# Patient Record
Sex: FEMALE | Race: WHITE | NOT HISPANIC OR LATINO
[De-identification: names, ages, dates, MRNs, and addresses within clinical notes are randomized per-mention and may not be internally consistent; named-entity substitution may affect disease eponyms.]

---

## 2017-05-16 ENCOUNTER — HOSPITAL ENCOUNTER (OUTPATIENT)
Dept: LAB | Age: 77
Setting detail: SPECIMEN
Discharge: HOME | End: 2017-05-16

## 2017-05-16 ENCOUNTER — RECORDS - HEALTHEAST (OUTPATIENT)
Dept: LAB | Facility: CLINIC | Age: 77
End: 2017-05-16

## 2017-05-16 LAB
CHOLEST SERPL-MCNC: 203 MG/DL
FASTING STATUS PATIENT QL REPORTED: ABNORMAL
HDLC SERPL-MCNC: 67 MG/DL
LDLC SERPL CALC-MCNC: 120 MG/DL
TRIGL SERPL-MCNC: 78 MG/DL

## 2020-09-14 ENCOUNTER — OFFICE VISIT - HEALTHEAST (OUTPATIENT)
Dept: UROLOGY | Facility: CLINIC | Age: 80
End: 2020-09-14

## 2020-09-14 DIAGNOSIS — N13.2 HYDRONEPHROSIS WITH URINARY OBSTRUCTION DUE TO URETERAL CALCULUS: ICD-10-CM

## 2020-09-14 DIAGNOSIS — N20.0 CALCULUS OF KIDNEY: ICD-10-CM

## 2020-09-14 DIAGNOSIS — N20.1 CALCULUS OF URETER: ICD-10-CM

## 2020-09-14 RX ORDER — ALENDRONATE SODIUM 70 MG/1
70 TABLET ORAL WEEKLY
Status: SHIPPED | COMMUNITY
Start: 2020-09-09

## 2020-09-21 ENCOUNTER — COMMUNICATION - HEALTHEAST (OUTPATIENT)
Dept: UROLOGY | Facility: CLINIC | Age: 80
End: 2020-09-21

## 2020-09-21 ENCOUNTER — AMBULATORY - HEALTHEAST (OUTPATIENT)
Dept: LAB | Facility: CLINIC | Age: 80
End: 2020-09-21

## 2020-09-21 DIAGNOSIS — N20.1 CALCULUS OF URETER: ICD-10-CM

## 2020-09-23 LAB
APPEARANCE STONE: NORMAL
COMPN STONE: NORMAL
NUMBER STONE: 1
SIZE STONE: NORMAL MM
SPECIMEN WT: 5 MG

## 2020-10-01 ENCOUNTER — OFFICE VISIT - HEALTHEAST (OUTPATIENT)
Dept: UROLOGY | Facility: CLINIC | Age: 80
End: 2020-10-01

## 2020-10-01 DIAGNOSIS — N20.1 CALCULUS OF URETER: ICD-10-CM

## 2020-10-01 DIAGNOSIS — N20.0 CALCULUS OF KIDNEY: ICD-10-CM

## 2021-06-11 NOTE — PROGRESS NOTES
"Assessment/Plan:        Diagnoses and all orders for this visit:    Calculus of ureter  -     Symptom Control While Passing a Stone Education  -     CT Abdomen Pelvis Without Oral Without IV Contrast; Future; Expected date: 09/28/2020  -     Patient Stated Goal: Pass my stone    Hydronephrosis with urinary obstruction due to ureteral calculus    Calculus of kidney    Other orders  -     alendronate (FOSAMAX) 70 MG tablet; Take 70 mg by mouth once a week.  -     aspirin 81 MG EC tablet; Take 81 mg by mouth daily.      Stone Management Plan  [facility] 9/14/2020   Urinary Tract Infection No suspicion of infection   Renal Colic Asymptomatic at this time   Renal Failure No suspicion of renal failure   Current CT date 9/12/2020   Right sided stones? Yes   R Number of ureteral stones No ureteral stones   R Number of kidney stones  2   R GSD of kidney stones < 2   R Hydronephrosis None   R Stone Event No current event   R Current Plan Observe   Observe rationale Limited stone burden with good prognosis for spontaneous passage   Left sided stones? Yes   L Number of ureteral stones 1   L GSD of ureteral stones 5   L Location of ureteral stone Proximal   L Number of kidney stones  2   L GSD of kidney stones < 2   L Hydronephrosis Mild   L Stone Event New event   Diagnosis date 9/12/2020   Initial location of primary symptomatic stone Proximal   Initial GSD of primary symptomatic stone 5   L MET Status Initiation   L Current Plan MET   MET 2 week F/U         Phone call duration: 17 minutes    Noemy Fair PA-C    Subjective:        The patient has been notified of following:     \"This telephone visit will be conducted via a call between you and your physician/provider. We have found that certain health care needs can be provided without the need for a physical exam.  This service lets us provide the care you need with a short phone conversation. If a prescription is necessary, we can send it directly to your " "pharmacy.  If labs and/or imaging are needed, we can place orders so that you can have the test (s) done at a later time.    If during the course of the call the physician/provider feels a telephone visit is not appropriate, you will not be charged for this service.\"     HPI  Ms. Yulissa Garcia is a 79 y.o.  female who is being evaluated via a billable telephone visit by [facility] following [facility] ER visit for urolithiasis.    She is a remotely recurrent unidentified composition stone former who has not required stone clearance procedures. She has not previously participated in stone risk evaluation. She has no identified modifiable stone risk factors. She has identified non-modifiable stone risks including:  bilateral stones.    She was evaluated in ER 9/12/20 for acute onset left flank pain x few hours. The pain was initially sharp and severe, reaching 8/10. It was nonradiating and felt similar to pain experienced with remote stone event ~ 8 years ago. She had associated vomiting, urinary frequency and diaphoresis. Workup was notable for CT reporting a left mid ureteral stone and bilateral renal stones. Labs were negative for infection or renal injury. She was sent home with zofran and oxycodone.    She is asymptomatic at present. She denies symptoms of fever, chills, flank pain, nausea, vomiting, urinary frequency and dysuria.     CT scan from 9/12/20 is personally reviewed and demonstrates a mildly obstructing 5 mm left proximal ureteral stone. Few tiny bilateral renal stones.    Significant labs from presentation include severe hematuria, no pyuria, negative nitrite, no bacteria, normal WBC, normal C reactive protein, normal creatinine and normal potassium.    PLAN    78 yo F with remote history of kidney stone disease. Obstructing left proximal ureteral stone, currently asymptomatic. Tiny, bilateral renal stones.     Will proceed with medical expulsive therapy. Risks " and benefits were detailed of medical expulsive therapy including probability of stone passage, recurrent renal colic, and requirement of emergency medical and/or surgical care and further imaging. Patient verbalized understanding. Patient agrees with plan as discussed. She will return in 2 weeks with low dose CT scan.    For symptom control, she was prescribed oxycodone and ondansetron. Over the counter symptom control medications of ibuprofen, Dramamine and Tylenol were recommended.     ROS   A 12 point comprehensive review of systems is negative except for HPI    No past medical history on file.    No past surgical history on file.    Current Outpatient Medications   Medication Sig Dispense Refill     acetaminophen (TYLENOL) 500 MG tablet Take 2 tablets (1,000 mg total) by mouth 4 (four) times a day for 7 days. 56 tablet 0     alendronate (FOSAMAX) 70 MG tablet Take 70 mg by mouth once a week.       aspirin 81 MG EC tablet Take 81 mg by mouth daily.       dimenhyDRINATE (DRAMAMINE) 50 MG tablet Take 1 tablet (50 mg total) by mouth at bedtime for 7 days. 7 tablet 0     ibuprofen (ADVIL,MOTRIN) 200 MG tablet Take 2 tablets (400 mg total) by mouth 4 (four) times a day for 7 days. 56 tablet 0     dimenhyDRINATE (DRAMAMINE) 50 MG tablet Take 1 tablet (50 mg total) by mouth 4 (four) times a day as needed. 28 tablet 0     ondansetron (ZOFRAN ODT) 4 MG disintegrating tablet Take 1 tablet (4 mg total) by mouth every 8 (eight) hours as needed for nausea. 12 tablet 0     oxyCODONE (ROXICODONE) 5 MG immediate release tablet Every 4-6 hours as needed if pain is not improved with acetaminophen and ibuprofen. 12 tablet 0     No current facility-administered medications for this visit.        No Known Allergies    Social History     Socioeconomic History     Marital status:      Spouse name: Not on file     Number of children: Not on file     Years of education: Not on file     Highest education level: Not on file    Occupational History     Not on file   Social Needs     Financial resource strain: Not on file     Food insecurity     Worry: Not on file     Inability: Not on file     Transportation needs     Medical: Not on file     Non-medical: Not on file   Tobacco Use     Smoking status: Not on file   Substance and Sexual Activity     Alcohol use: Not on file     Drug use: Not on file     Sexual activity: Not on file   Lifestyle     Physical activity     Days per week: Not on file     Minutes per session: Not on file     Stress: Not on file   Relationships     Social connections     Talks on phone: Not on file     Gets together: Not on file     Attends Jewish service: Not on file     Active member of club or organization: Not on file     Attends meetings of clubs or organizations: Not on file     Relationship status: Not on file     Intimate partner violence     Fear of current or ex partner: Not on file     Emotionally abused: Not on file     Physically abused: Not on file     Forced sexual activity: Not on file   Other Topics Concern     Not on file   Social History Narrative     Not on file       No family history on file.    Objective:      Physical Exam  There were no vitals filed for this visit.    Labs    Urinalysis POC (Office):  Nitrite, UA   Date Value Ref Range Status   09/12/2020 Negative Negative Final       Lab Urinalysis:  Blood, UA   Date Value Ref Range Status   09/12/2020 Large (!) Negative Final     Nitrite, UA   Date Value Ref Range Status   09/12/2020 Negative Negative Final     Leukocytes, UA   Date Value Ref Range Status   09/12/2020 Negative Negative Final     pH, UA   Date Value Ref Range Status   09/12/2020 6.0 4.5 - 8.0 Final    and Acute Labs   CBC   WBC   Date Value Ref Range Status   09/12/2020 7.1 4.0 - 11.0 thou/uL Final     Hemoglobin   Date Value Ref Range Status   09/12/2020 13.4 12.0 - 16.0 g/dL Final     Platelets   Date Value Ref Range Status   09/12/2020 159 140 - 440 thou/uL Final    , C Reactive Protein    CRP   Date Value Ref Range Status   09/12/2020 0.1 0.0 - 0.8 mg/dL Final    and Renal Panel  [facility]  Creatinine   Date Value Ref Range Status   09/12/2020 0.80 0.60 - 1.10 mg/dL Final   09/11/2015 0.76 0.60 - 1.10 mg/dL Final     Potassium   Date Value Ref Range Status   09/12/2020 4.0 3.5 - 5.0 mmol/L Final   09/11/2015 4.6 3.5 - 5.0 mmol/L Final     Calcium   Date Value Ref Range Status   09/12/2020 9.7 8.5 - 10.5 mg/dL Final   09/11/2015 9.6 8.5 - 10.5 mg/dL Final

## 2021-06-11 NOTE — PATIENT INSTRUCTIONS - HE
Patient Stated Goal: Prevent further stones  Calcium Oxalate Stone Prevention Self Management    Drink more fluids:    Drinking more liquids is the best way you can help prevent future stones. Stones can form when substances in the urine are too concentrated. The more you drink, the more urine you will make. This means all substances in the urine will be less concentrated.    How much urine should I be producing?    The usual recommended daily urine production is about 2 to 3 quarts (0163-9220 ml). If you are producing more than 3 quarts of urine on a regular basis, it is possible to deplete important minerals stored in the body.    To measure the amount of urine you produce in a day, you can either:    Collect all urine in a container and measure at the end of the day     Use a measuring cup each time you urinate and add up the amounts at the end of the day     Observe    Color - Dark pam urine is concentrated. Light straw color or lighter is dilute and desirable     Odor - Concentrated urine tends to smell stronger. Dilute urine is nearly odorless    Ways to increase your fluid intake    Increasing the amount of fluid you drink is effective for all types of kidney stones. While water is commonly recommended, all fluids are effective for increasing the amount of urine your body produces.    Focus on starting a lifelong habit, rather than a short-term solution.     Keep liquids on hand that you like. Crystal Light is a low calorie appropriate choice.    Drink out of larger glasses. You'll tend to drink more with each serving.     Have an additional glass of fluid with each meal.     Keep a water or drink bottle at work and fill it regularly.     *If you are prone to fluid retention, consult your doctor before making changes to your fluid habits.    Low Oxalate Diet:    Avoid excess amounts or daily consumption of these foods:    All nuts and nut products including peanuts, almonds, pecans, peanut butter, almond  milk    Rhubarb    Chocolate    Soybeans and soy products     Spinach    Wheat Germ    Beets    Maintain a normal calcium diet:    Researches have found that people with low calcium intakes tend to have more stones. Foods with high calcium content are acceptable and include:    Dairy products (including milk, cheese and yogurt)    Meat and fish    Enriched cereals    Dark green vegetables    What about calcium supplements?     Many people take calcium supplements, either on their own or as prescribed by a doctor. Research has indicated that calcium supplements do not usually pose a risk for stone formation.  Calcium citrate is a better choice for a supplement.    Avoid excess salt:    Salt (sodium chloride) is found in abundance in many foods. High sodium levels in the urine can interfere with the kidney's handling of calcium.     Tips for reducing the salt in your diet:    Don't use salt at the table    Reduce the salt used in food preparation. Try 1/2 teaspoon when recipes call for 1 teaspoon.    Use herbs and spices for flavoring instead of salt.    Avoid salty foods.    Check the label before you buy or use a product. Note sodium and portion size information.    Try to consume less than 2,000 mg/day. (1 teaspoon = 2,000 mg)    Foods with high sodium content include:    Processed meat (including luncheon meats, sausage)     Crackers     Instant cereal     Processed cheese     Canned soups     Chips and snack foods     Soy sauce    The [facility] can respond to your questions or concerns 24 hours a day at [phone].

## 2021-06-11 NOTE — PATIENT INSTRUCTIONS - HE
Patient Stated Goal: Pass my stone  Symptom Control While Passing A Stone    The goal of [facility] is to let a smaller kidney stone (less than 4 to 5 mm) pass without intervention if possible. Giving your body a chance to clear the stone may take a few hours up to a few weeks.  Keeping you well-informed, safe and fairly comfortable is important.    Drink to thirst  Do not attempt to  flush out  your stone by drinking too much fluid. This does not work and may increase nausea. Drink enough to satisfy your body s thirst. Eating your normal diet is fine.   Medications (that may be suggested or prescribed)    Ibuprofen (Advil or Motrin) Available over the counter  o Take two (200mg) tablets every six hours until the stone passes.  o Prevents spasm of the ureter.    o Decreases pain.      Dramamine* (drowsy version, non-generic formulation) Available over the counter  o Take 50mg at bedtime  o Decreases spasms of the ureter  o Decreases nausea  o Decreases acute pain  o Decreases recurrence of pain for 24 hours  o Will help you sleep  *This medication will cause increase drowsiness, do not drive or operate machinery for 6 hours.      Narcotics (Percocet, Vicodin, Dilaudid) Take as prescribed for severe pain unrelieved by ibuprofen and Dramamine  o Narcotics have significant side effects and only  cover-up  pain. They have no effect on the cause of pain.  o Common side effects  - Confusion, disorientation and sedation - DO NOT DRIVE OR OPERATE MACHINERY WITHIN 24 HOURS  - Nausea - take Dramamine or Zofran or Haldol to help control  - Constipation  - Sleep disturbances      Ondansetron (Zofran) Take as prescribed  o Reserve for severe nausea  o May cause constipation, start over the counter Miralax if needed      Second Line Anti-Nausea Medication: Adding a different anti-nausea medication maybe helpful for persistent nausea.  The combined effect of different types of anti-nausea medications maybe more  effective than either medication by itself, even in higher doses.  o Compazine: Take as prescribed      Information about kidney stones    Crystals can form if chemicals are too concentrated in your urine. If the crystal grows over time, a stone may form. A stone usually isn t painful while it is still in the kidney.    As the stone begins to leave the kidney, you may experience episodes of flank pain as the kidney stone approaches the entrance to the ureter. Some people feel a vague ache in the side.    Kidney stones may fall into the ureter. Some stones are tiny and pass through without causing symptoms. The ureter is a small tube (approximately 1/8 of an inch wide). A kidney stone can get stuck and block the ureter. If this happens, urine backs up and flows back to the kidney. Back pressure on the kidney can cause:  o Severe flank pain radiating to the groin.  o Severe nausea and vomiting.  o The pain can occur in the lower back, side, groin or all three.      When the stone reaches the lower ureter, this can irritate the bladder and sensations of feeling the urge to urinate frequently and urgently may occur.      Once the stone passes out of your ureter and into your bladder, the symptoms of urgency and frequency will often disappear. Sometimes pain will come back for a short period and will not be as severe as before. The passage of the stone from your bladder and out of your body is usually not a problem. The urethra is at least twice as wide as the normal ureter, so the stone doesn t usually block it.    Strain all urine  If you pass the stone, save it. Place it in the container we have provided and bring it to the [facility] within a week of passing it. Your stone will then be sent for analysis which takes about a month.     Signs and symptoms you might experience    Nausea    Decreased appetite    Urinary frequency    Bloody urine     Chills    Fatigue    When to call [facility] or  go to the Emergency Room    Fever with a temperature greater than 100.1    Severe pain    Persistent nausea/vomiting    If the pain worsens or nausea/vomiting is uncontrolled with medications, STOP eating & drinking. You need to have an empty stomach for 8 hours prior to surgery. Call the [facility] immediately at [phone].           Follow-up    Low dose CT scan with doctor visit 1-2 weeks after initial clinic visit per doctor s instructions    Please cancel the CT scan visit if you pass a stone. Reschedule for a one month follow-up with doctor to discuss stone composition and future prevention.    Preventing future stones    Approximately a month after your stone is sent out for analysis, a prevention visit will occur with your provider, to discuss an individualized plan for prevention of new stones and to discuss managing stones that you may still have. Along with the analysis of the kidney stone, blood and urine tests may be indicated to develop this plan. Knowing the type of kidney stones you make, and why, allows the providers at the [facility] to recommend specific ways to prevent them.    Follow-up visits are an important part of monitoring and preventing future re-occurrences.    The [facility] is available for questions or concerns 24 hours a day at [phone]

## 2021-06-11 NOTE — TELEPHONE ENCOUNTER
Patient called stating that she passed her stone.  She will drop it off for analysis and when results are in, she will schedule a f/u.  Lashonda Martinez RN

## 2021-06-11 NOTE — PROGRESS NOTES
"Assessment/Plan:        Diagnoses and all orders for this visit:    Calculus of ureter    Calculus of kidney  -     CT Abdomen Pelvis Without Oral Without IV Contrast; Future; Expected date: 10/01/2021  -     Patient Stated Goal: Prevent further stones  -     Calcium Oxalate Stone Prevention Education      Stone Management Plan  [facility] 9/14/2020 10/1/2020   Urinary Tract Infection No suspicion of infection No suspicion of infection   Renal Colic Asymptomatic at this time Asymptomatic at this time   Renal Failure No suspicion of renal failure No suspicion of renal failure   Current CT date 9/12/2020 -   Right sided stones? Yes -   R Number of ureteral stones No ureteral stones -   R Number of kidney stones  2 -   R GSD of kidney stones < 2 -   R Hydronephrosis None -   R Stone Event No current event No current event   R Current Plan Observe Observe   Observe rationale Limited stone burden with good prognosis for spontaneous passage Limited stone burden with good prognosis for spontaneous passage   Left sided stones? Yes -   L Number of ureteral stones 1 -   L GSD of ureteral stones 5 -   L Location of ureteral stone Proximal -   L Number of kidney stones  2 -   L GSD of kidney stones < 2 -   L Hydronephrosis Mild -   L Stone Event New event Resolved event   Diagnosis date 9/12/2020 -   Initial location of primary symptomatic stone Proximal -   Initial GSD of primary symptomatic stone 5 -   Resolved date - 9/18/2020   L MET Status Initiation Passed   L Current Plan MET Observe   MET 2 week F/U -   Observe rationale - Limited stone burden with good prognosis for spontaneous passage         Phone call duration: 11 minutes    Noemy Fair PA-C     Subjective:      The patient has been notified of following:     \"This telephone visit will be conducted via a call between you and your physician/provider. We have found that certain health care needs can be provided without the need for a physical exam.  This " "service lets us provide the care you need with a short phone conversation.  If a prescription is necessary we can send it directly to your pharmacy.  If labs and/or imaging are needed, we can place orders so you can have the test (s) done at a later time.    If during the course of the call the physician/provider feels a telephone visit is not appropriate, you will not be charged for this service.\"     HPI  Ms. Yulissa Garcia is a 79 y.o.  female who is being evaluated via a billable telephone visit by [facility] for medical expulsive therapy follow up.     On last encounter, her 5 mm stone was in left proximal ureter with mild hydronephrosis. She has had no unanticipated events.    She passed her stone in the interval and has analysis results to discuss. She is asymptomatic at present. She denies symptoms of fever, chills, flank pain, nausea, vomiting, urinary frequency and dysuria.     Imaging was not performed today because she has passed stone and captured for analysis.     Stone analysis from 9/18/20 is 70% CaP (apatite) and 30% CaOx.    PLAN    80 yo F with remote history of calcium based kidney stone disease. Interval passage of left proximal ureteral stone. Tiny, bilateral renal stones.    She is congratulated on passing her stone and will defer proceeding with stone risk evaluation. We reviewed dietary recommendations for stone prevention. She will return in 12 months for surveillance with imaging.       ROS   Review of systems is negative except for HPI.    No past medical history on file.    No past surgical history on file.    Current Outpatient Medications   Medication Sig Dispense Refill     alendronate (FOSAMAX) 70 MG tablet Take 70 mg by mouth once a week.       aspirin 81 MG EC tablet Take 81 mg by mouth daily.       No current facility-administered medications for this visit.        No Known Allergies    Social History     Socioeconomic History     " Marital status:      Spouse name: Not on file     Number of children: Not on file     Years of education: Not on file     Highest education level: Not on file   Occupational History     Not on file   Social Needs     Financial resource strain: Not on file     Food insecurity     Worry: Not on file     Inability: Not on file     Transportation needs     Medical: Not on file     Non-medical: Not on file   Tobacco Use     Smoking status: Not on file   Substance and Sexual Activity     Alcohol use: Not on file     Drug use: Not on file     Sexual activity: Not on file   Lifestyle     Physical activity     Days per week: Not on file     Minutes per session: Not on file     Stress: Not on file   Relationships     Social connections     Talks on phone: Not on file     Gets together: Not on file     Attends Qu[employer] service: Not on file     Active member of club or organization: Not on file     Attends meetings of clubs or organizations: Not on file     Relationship status: Not on file     Intimate partner violence     Fear of current or ex partner: Not on file     Emotionally abused: Not on file     Physically abused: Not on file     Forced sexual activity: Not on file   Other Topics Concern     Not on file   Social History Narrative     Not on file       No family history on file.

## 2021-06-16 PROBLEM — N20.0 CALCULUS OF KIDNEY: Status: ACTIVE | Noted: 2020-09-14

## 2021-06-16 PROBLEM — N13.2 HYDRONEPHROSIS WITH URINARY OBSTRUCTION DUE TO URETERAL CALCULUS: Status: ACTIVE | Noted: 2020-09-14

## 2021-06-16 PROBLEM — N20.1 CALCULUS OF URETER: Status: ACTIVE | Noted: 2020-09-14
